# Patient Record
Sex: FEMALE | Race: WHITE | Employment: PART TIME | URBAN - METROPOLITAN AREA
[De-identification: names, ages, dates, MRNs, and addresses within clinical notes are randomized per-mention and may not be internally consistent; named-entity substitution may affect disease eponyms.]

---

## 2019-03-22 ENCOUNTER — TELEPHONE (OUTPATIENT)
Dept: ENDOCRINOLOGY | Age: 74
End: 2019-03-22

## 2019-04-19 ENCOUNTER — TELEPHONE (OUTPATIENT)
Dept: ENDOCRINOLOGY | Age: 74
End: 2019-04-19

## 2019-05-15 ENCOUNTER — OFFICE VISIT (OUTPATIENT)
Dept: ENDOCRINOLOGY | Age: 74
End: 2019-05-15
Payer: MEDICARE

## 2019-05-15 VITALS
SYSTOLIC BLOOD PRESSURE: 137 MMHG | DIASTOLIC BLOOD PRESSURE: 78 MMHG | HEIGHT: 64 IN | BODY MASS INDEX: 24.34 KG/M2 | WEIGHT: 142.6 LBS

## 2019-05-15 DIAGNOSIS — N20.0 KIDNEY STONE: ICD-10-CM

## 2019-05-15 DIAGNOSIS — M89.9 DISEASE OF SKELETAL SYSTEM: Primary | ICD-10-CM

## 2019-05-15 PROCEDURE — 99205 OFFICE O/P NEW HI 60 MIN: CPT | Performed by: INTERNAL MEDICINE

## 2019-05-15 RX ORDER — MULTIVIT-MIN/IRON/FOLIC ACID/K 18-600-40
CAPSULE ORAL
COMMUNITY

## 2019-05-15 RX ORDER — CALCIUM CARBONATE 500(1250)
500 TABLET ORAL DAILY
COMMUNITY

## 2019-05-15 RX ORDER — M-VIT,TX,IRON,MINS/CALC/FOLIC 27MG-0.4MG
1 TABLET ORAL DAILY
COMMUNITY

## 2019-05-15 SDOH — HEALTH STABILITY: MENTAL HEALTH: HOW OFTEN DO YOU HAVE A DRINK CONTAINING ALCOHOL?: NEVER

## 2019-05-15 NOTE — LETTER
[address] and Osteoporosis  [address] [facility] [address] [facility], [address] [facility],[place] [address]  Phone [phone]  Fax [phone]         May 15, 2019         Samuel Zuniga MD, fax [phone]                            Re:  Sophie Adrian [birth year]    Dear Dr. Gary Combs:     Thank you for asking me to see Harini Louie in consultation. As you know, Ms. Taylor Cornell is a 68 y.o. woman found to have low bone density in 2000. BMD decreased 1066-8709 and likely decreased in the hip 9354-2305 but no more than expected for age-related change. We reviewed life-style issues (calcium, vitamin D and physical activity). I have ordered some diagnostic tests and will be back in touch when I have the results. Without effective treatment, risk of future fracture is high. I recommended pharmacologic treatment to reduce fracture risk and reviewed options. She will let me know if and when she is ready to commit to treatment. Enclosed is a copy of my consultation note. Please let me know if you have any questions. Sincerely,    Gurdeep Saul MD     Encl.  Copy of consult note    CC: Leesa Cid MD, fax [phone]

## 2019-05-15 NOTE — PROGRESS NOTES
[facility] ([facility]) Osteoporosis and [address] [facility]  [address]  [address] [facility], [address] [facility]  Phone [phone]  Fax [phone]    NAME:  Yovanny Reyes  :  [birth year]  CONSULT DATE:    05/15/2019  MOST RECENT VISIT:  05/15/2019  TODAYS DATE:  05/15/2019    Labs @ [facility] 2018     CONSULTATION REQUESTED BY: Becca Varma MD, fax [phone]  OTHERS WHO NEED REPORTS: Janet Sepulveda MD, fax [phone]    PROBLEMS. Low bone density by DXA 2016 or before, T-scores -2.1 in the spine, -2.0 in the left femoral neck    Family history of osteoporosis, mother, sister    BMD may have decreased 6294-4016, T-scores -2.2 in the spine, -2.2 in the left femoral neck    1998, age 47, wrist elbow and foot fracture (slipped going downhill)  Menopause from chemotherapy age 38  Breast cancer , , radiation therapy, high-dose steroids with chemo  Kidney stones about yearly since , most recent 2019  Back injury  Scoliosis  Spinal stenosis  Esophageal strictures requiring dilatation  Chronic fatigue    CURRENT MANAGEMENT FOR OSTEOPOROSIS. Calcium, 300 mg from low calcium foods, 300-600 mg yogurt, 300 mg cheese, 200+ dk green vegs, occ coconut milk   diet MVI Ca+D other total    Calcium 4615-6048  630   mg/d   Vitamin D   500 2000  IU/d     25-OH D 46 ng/mL  (desirable is 30-60 ng/mL)  Exercise, walks 3-5 mi 2x weekly, home exercises  Pharmacologic therapy: none    PREVIOUS MEDICATIONS FOR OSTEOPOROSIS. none    OTHER CURRENT MEDICATIONS (SELECTED): none  OTC MEDICATIONS (SELECTED): B complex, vitamin C    CHIEF COMPLAINT. Can I improve bone density    HISTORY OF PRESENT ILLNESS: See problem list for chronic/inactive conditions. Ms. Roland Burris is a 80-year-old woman who was found to have low bone density by DXA in 2016 or before. FOR FULL DETAILS OF FAMILY HISTORY, PAST MEDICAL AND SURGICAL HISTORY, SOCIAL HISTORY, AND REVIEW OF SYSTEMS, SEE PATIENT QUESTIONNAIRE OF TODAYS DATE. FAMILY HISTORY. Relevant hx in problem list and/or HPI. Otherwise not contributory. PAST MEDICAL HISTORY. Noted in health history form. PAST SURGICAL HISTORY. Noted in health history form. SOCIAL HISTORY. Nonsmoker. No excessive intake of alcohol, caffeine or sodas. Lives alone. REVIEW OF SYSTEMS. Maximum adult height 64. No significant height loss. Usual weight 149#. No recent significant change in weight. PHYSICAL EXAMINATION. GENERAL. Well-nourished, well-developed, normally proportioned adult. MENTAL STATUS. Pleasant mood. Oriented to time, place, and person. ORAL. Teeth appear to be in good condition. SKIN. Normal texture and turgor. LUNGS. Clear to auscultation. Breath sounds normal.  HEART. Heart sounds normal, no murmur or gallop. MUSCULOSKELETAL. The examination included inspection/palpation (any misalignment, asymmetry, crepitation, defects, tenderness, masses, effusions is noted), assessment of range of motion (any presence of pain, crepitation, contracture is noted), assessment of stability (any dislocation, subluxation, laxity is noted), assessment of muscle strength and tone (any atrophy or abnormal movements is noted). Pelvis appears normal.  Spinal contours are normal.  No spine tenderness to palpation or percussion. Three finger spaces between ribs and pelvis. Gait steady without assistance. NEUROLOGICAL. Able to rise from chair without using arms. No apparent motor or sensory deficit. Coordination appears normal     BONE DENSITY. Most recent done at [place] using [address] [place] [address] & [employer] equipment. I do not have printouts for 2000. T-scores   Initial study: 09/26/2000* L1-L4 -1.6 left fem. neck -1/2   Initial study: 11/23/2016* L1-L4 -2.1 left fem. neck -2.0   Current study: 02/05/2019* L1-L4 -2.2 left fem. neck -2.2     The table below shows bone mineral density (grams/cm2), the appropriate measure for comparing serial scans.  A significant increase or decrease is based on precision studies done at our center according to the ISCD protocol with a least significant change of 0.030 g/cm2. PA spine Proximal Femur (left)   Date L1-L4  Fem. neck Trochanter Total hip   09/26/2000* 0.991 --- --- 0.885   11/23/2016* 0.927 0.762 0.683 0.806   02/05/2019* 0.918 0.732 0.649 0.774   * Done with [email or web address] equipment. Bone density is low. Between 2016 and 2019, BMD did not change significantly in the spine but may have decreased in the left hip. 2016 report comparing with study in 2000 says 6.5% decrease in the spine and 5.7% decrease in the left total hip. This decrease is well within the range of expected age-related change. Labs: 04/2018 Ca 9.1 Cr 0.8. Imaging: DXA printouts reviewed. ASSESSMENT. Bone density is low and decreasing. Using FRAX, with BMD and risk factors of prior fracture, steroid use and parental fracture, 10-year risk of both hip and major-osteoporosis related fracture is well above the intervention thresholds. DIAGNOSTIC PLANS. In 2 weeks (or longer), on appropriate calcium intake, 24-hour urine for calcium, creatinine, oxalate, uric acid, citrate and sodium. I will be in touch with the patient to review lab results. THERAPEUTIC PLANS. Calcium, target 1200 mg daily; we discussed recommended calcium intake and the effects of excessive calcium intake from supplements. I provided a handout with information about how to calculate daily calcium intake. Advised to stop calcium supplements. Vitamin D, current vitamin D intake is fine. Exercise, current exercise program is fine. Pharmacologic therapy, we discussed treatment options for osteoporosis; alendronate, Actonel, Atelvia, Reclast and Prolia. I explained dosing instructions, the mechanism of action, side effects and safety concerns which are rare. She is not ready to commit but will let me know if and when she decides. Return appointment with DXA in 1 year.     I spent 60 minutes face to face with this patient. Over 50% of that time was spent on counseling and care coordination. See assessment and plan for counseling and care coordination details. Alexa Saul MD, Director, [facility] Osteoporosis and Bone Health Services    CC: Edouard Swan MD, fax [phone]  Willi Mehta MD, fax [phone]

## 2019-07-18 ENCOUNTER — TELEPHONE (OUTPATIENT)
Dept: ENDOCRINOLOGY | Age: 74
End: 2019-07-18

## 2019-08-27 ENCOUNTER — TELEPHONE (OUTPATIENT)
Dept: ENDOCRINOLOGY | Age: 74
End: 2019-08-27

## 2019-08-27 NOTE — TELEPHONE ENCOUNTER
Left message for the patient to call the office related to 9/17/19 appointment for lab follow up. [employer] message was sent for the 24 hr urine and the yearly visit is not due until in 11/2019. Please advise of appointment clarification  ----- Message from Melly Watts MD sent at 8/18/2019 10:01 AM EDT -----  Return appt 9/17/19 for \"lab f/u\" but I don't see any loose ends in the lab. She had a 24-h urine and I wrote or sent a [employer] message about that. Due for return with DXA (yearly) 11/2019. Please check.

## 2020-02-18 ENCOUNTER — HOSPITAL ENCOUNTER (OUTPATIENT)
Dept: GENERAL RADIOLOGY | Age: 75
Discharge: HOME | End: 2020-02-18
Payer: MEDICARE

## 2020-02-18 ENCOUNTER — OFFICE VISIT (OUTPATIENT)
Dept: ENDOCRINOLOGY | Age: 75
End: 2020-02-18
Payer: MEDICARE

## 2020-02-18 ENCOUNTER — PROCEDURE VISIT (OUTPATIENT)
Dept: ENDOCRINOLOGY | Age: 75
End: 2020-02-18
Payer: MEDICARE

## 2020-02-18 VITALS
DIASTOLIC BLOOD PRESSURE: 67 MMHG | SYSTOLIC BLOOD PRESSURE: 107 MMHG | WEIGHT: 130.4 LBS | BODY MASS INDEX: 22.26 KG/M2 | HEIGHT: 64 IN

## 2020-02-18 PROCEDURE — 77080 DXA BONE DENSITY AXIAL: CPT | Performed by: INTERNAL MEDICINE

## 2020-02-18 PROCEDURE — 99214 OFFICE O/P EST MOD 30 MIN: CPT | Performed by: INTERNAL MEDICINE

## 2020-02-18 PROCEDURE — 77080 DXA BONE DENSITY AXIAL: CPT

## 2020-02-18 NOTE — RESULT ENCOUNTER NOTE
[facility]) Osteoporosis and [address] [place] [address]   Phone [phone]  Fax [phone]    NAME: Earl Hardin   :    STUDY DATE: 2020     REFERRING PROVIDER: Jeffrey Fabian MD    INDICATION(S) FOR PERFORMING THE STUDY:  osteoporosis, age related (M81.0)    CLINICAL INFORMATION PROVIDED BY THE PATIENT: 78-year-old woman. Menopause from chemotherapy age 38. No history of fragility fractures. No long-term corticosteroid use. Family history of osteoporosis (mother, sister). EQUIPMENT: [employer]. POSITIONING: Good. REGIONS OF INTEREST: Correct. ARTIFACTS: None. STUDY VALID? Yes. Spine BMD is spuriously high because of generalized degenerative change. No adjustments were made. T-scores   Initial study: 2000* L1-L4 -1.6 left fem. neck -1.2   Current study: 2020 L1-L4 -2.7 left fem. neck -2.6     The table below shows bone mineral density (grams/cm2), the appropriate measure for comparing serial scans. A significant increase or decrease is based on precision studies done at our center according to the ISCD protocol with a least significant change of 0.030 g/cm2. PA spine Proximal Femur (left)   Date L1-L4  Fem. neck Trochanter Total hip   2000* 0.872 --- --- 0.822   2016* 0.813 0.619 0.564 0.746   2019* 0.805 0.593 0.534 0.715   2020 0.755 0.559 0.544 0.709   * Done with [employer] equipment. BMD converted to Hologic units. IMPRESSION:  BONE DENSITY IS LOW. SINCE THE LAST DXA, BMD DECREASED IN THE SPINE AND FEMORAL NECK. COMPARED WITH , IT IS LOWER NOW AT ALL SITES MEASURED. Consider repeating this study in 1-2 years to assess the patient's progress. _________________________________________________   Yunior Saul MD, Director, [facility] ([facility]) Osteoporosis and [address] [place] [address]

## 2020-02-18 NOTE — PROGRESS NOTES
AND SURGICAL HISTORY, SOCIAL HISTORY, AND REVIEW OF SYSTEMS, SEE PATIENT QUESTIONNAIRE OF TODAYS DATE. PHYSICAL EXAMINATION. GENERAL. Well-nourished, well-developed, normally proportioned adult. MENTAL STATUS. Pleasant mood. Oriented to time, place, and person. ORAL. Teeth appear to be in good condition. MUSCULOSKELETAL. Spinal contours are normal.  No spine tenderness to palpation or percussion. Three finger spaces between ribs and pelvis. Gait steady without assistance. NEUROLOGICAL. Able to rise from chair without using arms. No apparent motor or sensory deficit. Coordination appears normal     BONE DENSITY. Most recent done here using [employer] equipment. T-scores   Initial study: 09/26/2000* L1-L4 -1.6 left fem. neck -1.2   Current study: 02/18/2020 L1-L4 -2.7 left fem. neck -2.6     The table below shows bone mineral density (grams/cm2), the appropriate measure for comparing serial scans. A significant increase or decrease is based on precision studies done at our center according to the ISCD protocol with a least significant change of 0.030 g/cm2. PA spine Proximal Femur (left)   Date L1-L4  Fem. neck Trochanter Total hip   09/26/2000* 0.872 --- --- 0.822   11/23/2016* 0.813 0.619 0.564 0.746   02/05/2019* 0.805 0.593 0.534 0.715   02/18/2020 0.755 0.559 0.544 0.709   * Done with [employer] equipment. BMD converted to Hologic units. IMPRESSION:  BONE DENSITY IS LOW. SINCE THE LAST DXA, BMD DECREASED IN THE SPINE AND FEMORAL NECK. COMPARED WITH 2016, IT IS LOWER NOW AT ALL SITES MEASURED. Bone density is low. Between 2016 and 2019, BMD did not change significantly in the spine but may have decreased in the left hip. 2016 report comparing with study in 2000 says 6.5% decrease in the spine and 5.7% decrease in the left total hip. This decrease is well within the range of expected age-related change. Labs: 04/2018 Ca 9.1 Cr 0.8. 09/2019 Ca 9.8 Cr 0.9.   Imaging: DXA printouts reviewed. ASSESSMENT. Bone density is low and decreasing. BMD decreased 9307-3939, now at a level where intervention should be considered. PLANS. we discussed treatment options for osteoporosis; alendronate, Actonel, Atelvia, Reclast and Prolia. I explained dosing instructions, the mechanism of action, side effects and safety concerns which are rare. I suggested alendronate 70 mg weekly. She will discuss this with Dr. Terrence Stacy 04/2020 and let me know if she wants to proceed. Return appointment with DXA in 1 year. I spent 25 minutes face to face with this patient. Over 50% of that time was spent on counseling and care coordination. See assessment and plan for counseling and care coordination details. Liz Saul MD, Director, [facility]) Osteoporosis and [address]    CC: Td Garner MD, fax [phone]  Marcy Saleh MD, fax [phone]  Lelia Adler MD, [facility]

## 2023-07-19 ENCOUNTER — OFFICE (OUTPATIENT)
Dept: URBAN - METROPOLITAN AREA CLINIC 18 | Facility: CLINIC | Age: 78
End: 2023-07-19

## 2023-07-19 VITALS — WEIGHT: 130 LBS | HEIGHT: 64 IN | SYSTOLIC BLOOD PRESSURE: 122 MMHG | DIASTOLIC BLOOD PRESSURE: 74 MMHG

## 2023-07-19 DIAGNOSIS — K57.30 DIVERTICULOSIS OF LARGE INTESTINE WITHOUT PERFORATION OR ABS: ICD-10-CM

## 2023-07-19 DIAGNOSIS — Z86.010 PERSONAL HISTORY OF COLONIC POLYPS: ICD-10-CM

## 2023-07-19 PROCEDURE — 99213 OFFICE O/P EST LOW 20 MIN: CPT | Performed by: INTERNAL MEDICINE

## 2023-08-30 ENCOUNTER — AMBULATORY SURGICAL CENTER (OUTPATIENT)
Dept: URBAN - METROPOLITAN AREA SURGERY 7 | Facility: SURGERY | Age: 78
End: 2023-08-30
Payer: MEDICARE

## 2023-08-30 VITALS
HEART RATE: 69 BPM | HEART RATE: 75 BPM | OXYGEN SATURATION: 97 % | SYSTOLIC BLOOD PRESSURE: 140 MMHG | DIASTOLIC BLOOD PRESSURE: 76 MMHG | RESPIRATION RATE: 11 BRPM | HEART RATE: 68 BPM | OXYGEN SATURATION: 100 % | RESPIRATION RATE: 16 BRPM | HEART RATE: 70 BPM | SYSTOLIC BLOOD PRESSURE: 137 MMHG | RESPIRATION RATE: 19 BRPM | HEART RATE: 77 BPM | DIASTOLIC BLOOD PRESSURE: 63 MMHG | DIASTOLIC BLOOD PRESSURE: 61 MMHG | OXYGEN SATURATION: 98 % | OXYGEN SATURATION: 98 % | RESPIRATION RATE: 5 BRPM | DIASTOLIC BLOOD PRESSURE: 86 MMHG | TEMPERATURE: 97.7 F | DIASTOLIC BLOOD PRESSURE: 55 MMHG | SYSTOLIC BLOOD PRESSURE: 130 MMHG | SYSTOLIC BLOOD PRESSURE: 147 MMHG | OXYGEN SATURATION: 100 % | HEART RATE: 80 BPM | RESPIRATION RATE: 20 BRPM | SYSTOLIC BLOOD PRESSURE: 120 MMHG | RESPIRATION RATE: 21 BRPM | DIASTOLIC BLOOD PRESSURE: 60 MMHG | DIASTOLIC BLOOD PRESSURE: 60 MMHG | SYSTOLIC BLOOD PRESSURE: 133 MMHG | SYSTOLIC BLOOD PRESSURE: 131 MMHG | HEART RATE: 84 BPM | RESPIRATION RATE: 21 BRPM | HEIGHT: 64 IN | TEMPERATURE: 97.7 F | DIASTOLIC BLOOD PRESSURE: 70 MMHG | DIASTOLIC BLOOD PRESSURE: 68 MMHG | WEIGHT: 123 LBS | HEART RATE: 84 BPM | HEIGHT: 64 IN | HEART RATE: 76 BPM | RESPIRATION RATE: 17 BRPM | SYSTOLIC BLOOD PRESSURE: 133 MMHG | DIASTOLIC BLOOD PRESSURE: 70 MMHG | WEIGHT: 123 LBS | HEART RATE: 74 BPM | HEART RATE: 70 BPM | SYSTOLIC BLOOD PRESSURE: 150 MMHG | HEART RATE: 78 BPM | HEART RATE: 78 BPM | SYSTOLIC BLOOD PRESSURE: 112 MMHG | OXYGEN SATURATION: 99 % | DIASTOLIC BLOOD PRESSURE: 61 MMHG | HEART RATE: 75 BPM | SYSTOLIC BLOOD PRESSURE: 106 MMHG | HEART RATE: 69 BPM | RESPIRATION RATE: 17 BRPM | DIASTOLIC BLOOD PRESSURE: 66 MMHG | RESPIRATION RATE: 11 BRPM | OXYGEN SATURATION: 99 % | SYSTOLIC BLOOD PRESSURE: 130 MMHG | DIASTOLIC BLOOD PRESSURE: 68 MMHG | SYSTOLIC BLOOD PRESSURE: 111 MMHG | SYSTOLIC BLOOD PRESSURE: 116 MMHG | RESPIRATION RATE: 19 BRPM | SYSTOLIC BLOOD PRESSURE: 112 MMHG | DIASTOLIC BLOOD PRESSURE: 82 MMHG | DIASTOLIC BLOOD PRESSURE: 63 MMHG | HEART RATE: 76 BPM | HEART RATE: 80 BPM | SYSTOLIC BLOOD PRESSURE: 123 MMHG | SYSTOLIC BLOOD PRESSURE: 116 MMHG | SYSTOLIC BLOOD PRESSURE: 106 MMHG | SYSTOLIC BLOOD PRESSURE: 131 MMHG | HEART RATE: 74 BPM | RESPIRATION RATE: 20 BRPM | DIASTOLIC BLOOD PRESSURE: 76 MMHG | HEART RATE: 77 BPM | SYSTOLIC BLOOD PRESSURE: 140 MMHG | SYSTOLIC BLOOD PRESSURE: 137 MMHG | SYSTOLIC BLOOD PRESSURE: 150 MMHG | SYSTOLIC BLOOD PRESSURE: 120 MMHG | HEART RATE: 68 BPM | DIASTOLIC BLOOD PRESSURE: 67 MMHG | DIASTOLIC BLOOD PRESSURE: 67 MMHG | RESPIRATION RATE: 5 BRPM | DIASTOLIC BLOOD PRESSURE: 66 MMHG | DIASTOLIC BLOOD PRESSURE: 82 MMHG | SYSTOLIC BLOOD PRESSURE: 123 MMHG | SYSTOLIC BLOOD PRESSURE: 111 MMHG | DIASTOLIC BLOOD PRESSURE: 55 MMHG | DIASTOLIC BLOOD PRESSURE: 86 MMHG | OXYGEN SATURATION: 97 % | HEART RATE: 73 BPM | SYSTOLIC BLOOD PRESSURE: 147 MMHG | HEART RATE: 73 BPM | RESPIRATION RATE: 16 BRPM

## 2023-08-30 DIAGNOSIS — K57.30 DIVERTICULOSIS OF LARGE INTESTINE WITHOUT PERFORATION OR ABS: ICD-10-CM

## 2023-08-30 DIAGNOSIS — Z86.010 PERSONAL HISTORY OF COLONIC POLYPS: ICD-10-CM

## 2023-08-30 PROCEDURE — G0105 COLORECTAL SCRN; HI RISK IND: HCPCS | Performed by: INTERNAL MEDICINE
